# Patient Record
Sex: FEMALE | URBAN - METROPOLITAN AREA
[De-identification: names, ages, dates, MRNs, and addresses within clinical notes are randomized per-mention and may not be internally consistent; named-entity substitution may affect disease eponyms.]

---

## 2023-09-12 ENCOUNTER — NURSE TRIAGE (OUTPATIENT)
Dept: NURSING | Facility: CLINIC | Age: 23
End: 2023-09-12

## 2023-09-13 NOTE — TELEPHONE ENCOUNTER
"Pt reports clitoral irritation. \"Noticed a couple of hours ago\". \"Not like constant, noticed when going to the bathroom\". Pt denies fever, unusual discharge, abdominal pain, bleeding or other acute symptoms. Pt denies concern about STD's \"full panel testing last week\". Pt thinks perhaps \"rough intercourse\" injured the area. Pt reports any sex she has engaged in has been consensual and she does not have concerns about assault or rape. Pt reports irritation is moderate.     Writer advised pt to gently cleanse area, sitz bath, cotton underwear. Call back if new or worsening symptoms. Contact primary clinic if symptoms persist.    Pt verbalizes understanding and agrees to plan.     Reason for Disposition   Followed a genital area injury (e.g., vagina, vulva)   Small cut (scratch) or abrasion (scrape) is also present    Additional Information   Negative: [1] Major bleeding (e.g., actively dripping or spurting) AND [2] can't be stopped   Negative: Shock suspected (e.g., cold/pale/clammy skin, too weak to stand, low BP, rapid pulse)   Negative: Sounds like a life-threatening emergency to the triager   Negative: Sexual assault or rape (sexual intercourse or activity occurs without freely given consent), known or suspected   Negative: Pulled groin muscle   Negative: Wound looks infected   Negative: Vaginal foreign body is main concern   Negative: [1] MODERATE-SEVERE pain AND [2] lasts > 1 hour   Negative: Bleeding from inside the vagina  (Exception: Bleeding is definitely from menstrual period.)   Negative: Bleeding from inside the rectum   Negative: Skin is split open or gaping (or length > 1/2 inch or 12 mm)   Negative: [1] Bleeding from wound AND [2] won't stop after 10 minutes of direct pressure (using correct technique)   Negative: Can't urinate or difficulty passing urine   Negative: Blood in urine (red, pink, or tea-colored)   Negative: [1] Abdominal pain AND [2] after rectal or vaginal penetration (e.g., penis, " foreign body)   Negative: Sounds like a serious injury to the triager   Negative: Pain or burning with passing urine (urination)   Negative: Patient is confused or is an unreliable provider of information (e.g., dementia, severe intellectual disability, alcohol intoxication)   Negative: [1] MILD pain AND [2] lasts > 1 day   Negative: Large bruise or swelling > 2 inches (5 cm)   Negative: Suspicious history for the injury   Negative: [1] No prior tetanus shots (or is not fully vaccinated) AND [2] any wound (e.g., cut, scrape)   Negative: [1] HIV positive or severe immunodeficiency (severely weak immune system) AND [2] DIRTY cut or scrape   Negative: [1] Last tetanus shot > 5 years ago AND [2] any cut or scrape   Negative: [1] Bruising AND [2] lasts > 7 days   Negative: Direct blow to area (e.g., kicked, bicycle straddle injury)    Protocols used: Vulvar Symptoms-A-AH, Genital Injury - Female-A-AH

## 2024-05-17 ENCOUNTER — NURSE TRIAGE (OUTPATIENT)
Dept: NURSING | Facility: CLINIC | Age: 24
End: 2024-05-17

## 2024-05-17 NOTE — TELEPHONE ENCOUNTER
"Nurse Triage SBAR    Is this a 2nd Level Triage? NO    Situation:   Throat and ear pain    Background:   Cold symptoms for about a month.  Hx tonsil stones    Assessment:   She has throat and ear pain.  This morning, she thought she had tonsil stones.   She pressed her tongue against her tonsil and saw some blood.    She still has a lot of mucus especially when she wakes up in the morning.  No fevers.  No difficulty breathing or swallowing.    Protocol Recommended Disposition:   See PCP Within 24 Hours    Recommendation:   Advised pt to be seen in clinic today.  Care advice given.  Pt verbalized understanding.    Luzmaria Bruno, RN, BSN Nurse Triage Advisor 5/17/2024 6:37 AM      Reason for Disposition   Earache also present    Additional Information   Negative: SEVERE difficulty breathing (e.g., struggling for each breath, speaks in single words, stridor)   Negative: Sounds like a life-threatening emergency to the triager   Negative: [1] Drooling or spitting out saliva (because can't swallow) AND [2] normal breathing   Negative: Unable to open mouth completely   Negative: [1] Difficulty breathing AND [2] not severe   Negative: Fever > 104 F (40 C)   Negative: [1] Refuses to drink anything AND [2] for > 12 hours   Negative: [1] Drinking very little AND [2] dehydration suspected (e.g., no urine > 12 hours, very dry mouth, very lightheaded)   Negative: Patient sounds very sick or weak to the triager   Negative: SEVERE (e.g., excruciating) throat pain   Negative: [1] Pus on tonsils (back of throat) AND [2]  fever AND [3] swollen neck lymph nodes (\"glands\")   Negative: [1] Rash AND [2] widespread (especially chest and abdomen)    Protocols used: Sore Throat-A-AH    "

## 2024-12-01 ENCOUNTER — NURSE TRIAGE (OUTPATIENT)
Dept: NURSING | Facility: CLINIC | Age: 24
End: 2024-12-01

## 2024-12-01 NOTE — TELEPHONE ENCOUNTER
Patient has a hx of chlamydia and is experiencing ongoing LLQ pain that she describes as dull. Patient is wondering about the possibility of still having an STI after having tested negative.  Patient is advised to be retested. Patient declines triage stating that she has had this pain for a while now and doesn't think it would be an emergency.  Patient verbalized understanding of care advice.  Anni Nielsen RN on 12/1/2024 at 3:32 AM    Reason for Disposition    Health Information question, no triage required and triager able to answer question    Protocols used: Information Only Call - No Triage-A-